# Patient Record
Sex: MALE | Race: BLACK OR AFRICAN AMERICAN | NOT HISPANIC OR LATINO | Employment: UNEMPLOYED | URBAN - METROPOLITAN AREA
[De-identification: names, ages, dates, MRNs, and addresses within clinical notes are randomized per-mention and may not be internally consistent; named-entity substitution may affect disease eponyms.]

---

## 2022-02-02 ENCOUNTER — HOSPITAL ENCOUNTER (EMERGENCY)
Facility: HOSPITAL | Age: 22
Discharge: HOME | End: 2022-02-02
Attending: EMERGENCY MEDICINE
Payer: MEDICAID

## 2022-02-02 VITALS
HEART RATE: 103 BPM | TEMPERATURE: 97 F | SYSTOLIC BLOOD PRESSURE: 141 MMHG | RESPIRATION RATE: 18 BRPM | WEIGHT: 163 LBS | DIASTOLIC BLOOD PRESSURE: 71 MMHG | OXYGEN SATURATION: 100 % | HEIGHT: 72 IN | BODY MASS INDEX: 22.08 KG/M2

## 2022-02-02 DIAGNOSIS — K04.01 ACUTE PULPITIS: ICD-10-CM

## 2022-02-02 DIAGNOSIS — K08.89 TOOTHACHE: Primary | ICD-10-CM

## 2022-02-02 PROCEDURE — 99284 EMERGENCY DEPT VISIT MOD MDM: CPT

## 2022-02-02 PROCEDURE — 25000003 PHARM REV CODE 250: Performed by: NURSE PRACTITIONER

## 2022-02-02 RX ORDER — PENICILLIN V POTASSIUM 250 MG/1
500 TABLET, FILM COATED ORAL
Status: COMPLETED | OUTPATIENT
Start: 2022-02-02 | End: 2022-02-02

## 2022-02-02 RX ORDER — IBUPROFEN 600 MG/1
600 TABLET ORAL
Status: COMPLETED | OUTPATIENT
Start: 2022-02-02 | End: 2022-02-02

## 2022-02-02 RX ORDER — PENICILLIN V POTASSIUM 500 MG/1
500 TABLET, FILM COATED ORAL EVERY 6 HOURS
Qty: 40 TABLET | Refills: 0 | Status: SHIPPED | OUTPATIENT
Start: 2022-02-02 | End: 2022-02-12

## 2022-02-02 RX ORDER — IBUPROFEN 800 MG/1
800 TABLET ORAL EVERY 6 HOURS PRN
Qty: 20 TABLET | Refills: 0 | Status: SHIPPED | OUTPATIENT
Start: 2022-02-02

## 2022-02-02 RX ADMIN — IBUPROFEN 600 MG: 600 TABLET ORAL at 08:02

## 2022-02-02 RX ADMIN — PENICILLIN V POTASSIUM 500 MG: 250 TABLET, FILM COATED ORAL at 08:02

## 2022-02-02 NOTE — ED PROVIDER NOTES
Encounter Date: 2/2/2022    SCRIBE #1 NOTE: I, Giovana Torres, am scribing for, and in the presence of,  Radha Santiago NP. I have scribed the following portions of the note - Other sections scribed: HPI, ROS, PE.       History     Chief Complaint   Patient presents with    Dental Pain     Presents to the ED via EMS with c/o L back mouth pain that worsened today after cracking a molar x 1 month ago. Reports it slightly bled when it occurred but not bleeding at this time. No jaw swelling noted in triage. Reports taking edible this AM.      Torie Monroy is a 21 y.o. male who presents to the ED today with a complaint of 10/10 dental pain to the left side of his mouth for 2 days. The patient states that he was eating a couple of months ago and cracked his tooth but did not feel pain until 2 days ago. He reports that he feels swelling to the area. He denies difficulty swallowing, choking, fever, chills, generalized myalgias, or other associated symptoms. He reports that his parents gave him a white round medication to place on the tooth but notes no relief. No other complaints at this time.    The history is provided by the patient.     Review of patient's allergies indicates:  No Known Allergies  History reviewed. No pertinent past medical history.  No past surgical history on file.  History reviewed. No pertinent family history.     Review of Systems   Constitutional: Negative for chills and fever.   HENT: Positive for dental problem (left side of mouth). Negative for sore throat and trouble swallowing.    Eyes: Negative for visual disturbance.   Respiratory: Negative for choking and shortness of breath.    Cardiovascular: Negative for chest pain.   Gastrointestinal: Negative for abdominal pain, diarrhea, nausea and vomiting.   Genitourinary: Negative for dysuria.   Musculoskeletal: Negative for myalgias and neck pain.   Skin: Negative for rash.   Allergic/Immunologic: Negative for immunocompromised state.    Neurological: Negative for headaches.   Psychiatric/Behavioral: Negative for dysphoric mood.       Physical Exam     Initial Vitals [02/02/22 0733]   BP Pulse Resp Temp SpO2   (!) 141/71 103 18 97.3 °F (36.3 °C) 100 %      MAP       --         Physical Exam    Nursing note and vitals reviewed.  Constitutional: Vital signs are normal. He appears well-developed and well-nourished. He is not diaphoretic. He is active and cooperative. No distress.   HENT:   Mouth/Throat: No trismus in the jaw.       #18 is cracked with tenderness to percussion. No excessive drooling, trismus, sublingual swelling, purulence, drainage.   Eyes: Pupils are equal, round, and reactive to light.   Neck:   Normal range of motion.  Pulmonary/Chest: No respiratory distress.   Musculoskeletal:      Cervical back: Normal range of motion.     Neurological: He is alert and oriented to person, place, and time. No sensory deficit.   Skin: Skin is warm and dry.   Psychiatric: He has a normal mood and affect.         ED Course   Procedures  Labs Reviewed - No data to display       Imaging Results    None          Medications   ibuprofen tablet 600 mg (600 mg Oral Given 2/2/22 0830)   penicillin v potassium tablet 500 mg (500 mg Oral Given 2/2/22 0830)     Medical Decision Making:   Differential Diagnosis:   Pulpitis, dental infection  ED Management:  This is an urgent evaluation of a 20 y/o male that presents to the ED with dental pain.  He appears to have pulpitis, possibly a periapical abscess/dental infection. Pt is afebrile, non-toxic in appearance; no signs of Perfecto's angina, airway compromise, facial cellulitis/swelling, sepsis, dehydration, or a fluctuant abscess to drain.  Will discharge with Pen VK and pain control.  Given return precautions and instructed to follow up with a dentist within a week.            Scribe Attestation:   Scribe #1: I performed the above scribed service and the documentation accurately describes the services I  performed. I attest to the accuracy of the note.                 Clinical Impression:   Final diagnoses:  [K08.89] Toothache (Primary)  [K04.01] Acute pulpitis          ED Disposition Condition    Discharge Stable        ED Prescriptions     Medication Sig Dispense Start Date End Date Auth. Provider    penicillin v potassium (VEETID) 500 MG tablet Take 1 tablet (500 mg total) by mouth every 6 (six) hours. for 10 days 40 tablet 2/2/2022 2/12/2022 Radha Santiago NP    ibuprofen (ADVIL,MOTRIN) 800 MG tablet Take 1 tablet (800 mg total) by mouth every 6 (six) hours as needed for Pain. 20 tablet 2/2/2022  Radha Santiago NP        Follow-up Information     Follow up With Specialties Details Why Contact [facility]   As soon as possible for further evaluation of your dental pain [facility]  -Arrive 8AM, no later Mon-Thur  -$[phone] per extraction cash/credit card    [id number]  [place] [id number] [phone]        I, Radha Santiago, personally performed the services described in this documentation. All medical record entries made by the scribe were at my direction and in my presence. I have reviewed the chart and agree that the record reflects my personal performance and is accurate and complete.     Radha Santiago NP  02/02/22 4720

## 2022-02-02 NOTE — ED TRIAGE NOTES
Pt. Came in by ambulance complaining of a toothache to the left side of his mouth. Pt. States that a piece of his tooth broke of while eating and not his tooth has a hole in it. Pt. States that 2 days ago his tooth started hurting. Pt. Rates his pain at a 10/10 on the pain scale. Pt. States that he has been taking over the counter medications with no relief from his pain. Pt. States that he has not made a dentist appointment as of yet.

## 2022-02-02 NOTE — DISCHARGE INSTRUCTIONS
It appears you have a dental infection.    Please take PENICILLIN for the infection for 10 days.    Take IBUPROFEN for pain/inflammation.  Always take Ibuprofen with food, never on an empty stomach. Drink plenty of water when taking this medication.    Please follow up with a dentist within 1 week. Call for an appointment as soon as possible.  If you do not have a dentist, refer to the dental resources page for nearby clinics.    Return to the ER for any new or worsening symptoms or concerns.